# Patient Record
Sex: MALE | Race: WHITE
[De-identification: names, ages, dates, MRNs, and addresses within clinical notes are randomized per-mention and may not be internally consistent; named-entity substitution may affect disease eponyms.]

---

## 2019-11-25 ENCOUNTER — HOSPITAL ENCOUNTER (OUTPATIENT)
Dept: HOSPITAL 8 - CVU | Age: 79
Discharge: HOME | End: 2019-11-25
Attending: INTERNAL MEDICINE
Payer: MEDICARE

## 2019-11-25 DIAGNOSIS — I65.23: Primary | ICD-10-CM

## 2019-11-25 DIAGNOSIS — Z87.891: ICD-10-CM

## 2019-11-25 DIAGNOSIS — I70.8: ICD-10-CM

## 2019-11-25 DIAGNOSIS — E78.5: ICD-10-CM

## 2019-11-25 DIAGNOSIS — I10: ICD-10-CM

## 2019-11-25 PROCEDURE — 93880 EXTRACRANIAL BILAT STUDY: CPT

## 2019-12-02 ENCOUNTER — HOSPITAL ENCOUNTER (EMERGENCY)
Dept: HOSPITAL 8 - ED | Age: 79
Discharge: HOME | End: 2019-12-02
Payer: MEDICARE

## 2019-12-02 VITALS — HEIGHT: 68 IN | WEIGHT: 143.3 LBS | BODY MASS INDEX: 21.72 KG/M2

## 2019-12-02 VITALS — DIASTOLIC BLOOD PRESSURE: 63 MMHG | SYSTOLIC BLOOD PRESSURE: 136 MMHG

## 2019-12-02 DIAGNOSIS — J44.9: ICD-10-CM

## 2019-12-02 DIAGNOSIS — S62.663B: Primary | ICD-10-CM
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
## 2019-12-02 PROCEDURE — 99285 EMERGENCY DEPT VISIT HI MDM: CPT

## 2019-12-02 PROCEDURE — 90471 IMMUNIZATION ADMIN: CPT

## 2019-12-02 PROCEDURE — 13131 CMPLX RPR F/C/C/M/N/AX/G/H/F: CPT

## 2019-12-02 PROCEDURE — 12041 INTMD RPR N-HF/GENIT 2.5CM/<: CPT

## 2019-12-02 PROCEDURE — 90715 TDAP VACCINE 7 YRS/> IM: CPT

## 2019-12-02 NOTE — NUR
PT HERE WITH C/O LACERATION X 3 TO LEFT POINTER, MIDDLE, AND RING FINGER. PT 
STATES HE SLIPPED ON ICE WHILE ATTEMPTING TO "UNCLOG" THE . LAC TO 
MIDDLE FINGER X 3, SECOND AND FOURTH FINGER X 1 ON EACH. BLEEDING CONTROLLED, 
PA AT BEDSIDE.

## 2020-07-26 ENCOUNTER — HOSPITAL ENCOUNTER (EMERGENCY)
Dept: HOSPITAL 8 - ED | Age: 80
Discharge: HOME | End: 2020-07-26
Payer: MEDICARE

## 2020-07-26 VITALS — WEIGHT: 139.99 LBS | BODY MASS INDEX: 21.97 KG/M2 | HEIGHT: 67 IN

## 2020-07-26 VITALS — DIASTOLIC BLOOD PRESSURE: 72 MMHG | SYSTOLIC BLOOD PRESSURE: 141 MMHG

## 2020-07-26 DIAGNOSIS — S51.031A: Primary | ICD-10-CM
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
## 2020-07-26 DIAGNOSIS — Z87.891: ICD-10-CM
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
## 2020-07-26 PROCEDURE — 99283 EMERGENCY DEPT VISIT LOW MDM: CPT

## 2020-07-26 NOTE — NUR
TO ROOM AT THIS TIME. PT IN NO OBVIOUS DISTRESS. HELPING SON AND A SCREW WAS 
SENT FLYING INTO HIS ARM DURING USE OF CHAINSAW. BLEEDING CONTROLLED. WIFE AT 
SIDE. ALERT AND ORIENTED. PT REPORTS TETANUS IMMUNIZATION LAST YEAR.

## 2020-11-18 ENCOUNTER — HOSPITAL ENCOUNTER (OUTPATIENT)
Dept: HOSPITAL 8 - LAB | Age: 80
Discharge: HOME | End: 2020-11-18
Attending: INTERNAL MEDICINE
Payer: MEDICARE

## 2020-11-18 DIAGNOSIS — I25.10: ICD-10-CM

## 2020-11-18 DIAGNOSIS — I10: ICD-10-CM

## 2020-11-18 DIAGNOSIS — I65.29: ICD-10-CM

## 2020-11-18 DIAGNOSIS — E78.2: Primary | ICD-10-CM

## 2020-11-18 LAB
ALBUMIN SERPL-MCNC: 4.2 G/DL (ref 3.4–5)
ALP SERPL-CCNC: 60 U/L (ref 45–117)
ALT SERPL-CCNC: 17 U/L (ref 12–78)
ANION GAP SERPL CALC-SCNC: 5 MMOL/L (ref 5–15)
BILIRUB SERPL-MCNC: 0.5 MG/DL (ref 0.2–1)
CALCIUM SERPL-MCNC: 9.2 MG/DL (ref 8.5–10.1)
CHLORIDE SERPL-SCNC: 112 MMOL/L (ref 98–107)
CHOL/HDL RATIO: 2.4
CREAT SERPL-MCNC: 1.26 MG/DL (ref 0.7–1.3)
HDL CHOL %: 41 % (ref 26–37)
HDL CHOLESTEROL (DIRECT): 49 MG/DL (ref 40–60)
LDL CHOLESTEROL,CALCULATED: 58 MG/DL (ref 54–169)
LDLC/HDLC SERPL: 1.2 {RATIO} (ref 0.5–3)
PROT SERPL-MCNC: 7.7 G/DL (ref 6.4–8.2)
TRIGL SERPL-MCNC: 64 MG/DL (ref 50–200)
VLDLC SERPL CALC-MCNC: 13 MG/DL (ref 0–25)

## 2020-11-18 PROCEDURE — 80061 LIPID PANEL: CPT

## 2020-11-18 PROCEDURE — 80053 COMPREHEN METABOLIC PANEL: CPT

## 2020-11-18 PROCEDURE — 36415 COLL VENOUS BLD VENIPUNCTURE: CPT

## 2021-05-06 ENCOUNTER — HOSPITAL ENCOUNTER (INPATIENT)
Dept: HOSPITAL 8 - ED | Age: 81
LOS: 6 days | Discharge: HOME | DRG: 177 | End: 2021-05-12
Admitting: EMERGENCY MEDICINE
Payer: MEDICARE

## 2021-05-06 VITALS — DIASTOLIC BLOOD PRESSURE: 56 MMHG | SYSTOLIC BLOOD PRESSURE: 104 MMHG

## 2021-05-06 VITALS — HEIGHT: 67 IN | BODY MASS INDEX: 20.52 KG/M2 | WEIGHT: 130.73 LBS

## 2021-05-06 DIAGNOSIS — E87.5: ICD-10-CM

## 2021-05-06 DIAGNOSIS — Z87.891: ICD-10-CM

## 2021-05-06 DIAGNOSIS — I10: ICD-10-CM

## 2021-05-06 DIAGNOSIS — J43.9: ICD-10-CM

## 2021-05-06 DIAGNOSIS — N17.0: ICD-10-CM

## 2021-05-06 DIAGNOSIS — U07.1: Primary | ICD-10-CM

## 2021-05-06 DIAGNOSIS — J96.01: ICD-10-CM

## 2021-05-06 DIAGNOSIS — I95.9: ICD-10-CM

## 2021-05-06 DIAGNOSIS — E87.6: ICD-10-CM

## 2021-05-06 DIAGNOSIS — J12.82: ICD-10-CM

## 2021-05-06 DIAGNOSIS — Z90.79: ICD-10-CM

## 2021-05-06 DIAGNOSIS — E78.5: ICD-10-CM

## 2021-05-06 DIAGNOSIS — N40.0: ICD-10-CM

## 2021-05-06 LAB
ACETONE EXG QL: 1.21 UG/MLFEU (ref 0–0.52)
ALBUMIN SERPL-MCNC: 2.8 G/DL (ref 3.4–5)
ALP SERPL-CCNC: 66 U/L (ref 45–117)
ALT SERPL-CCNC: 37 U/L (ref 12–78)
ANION GAP SERPL CALC-SCNC: 9 MMOL/L (ref 5–15)
APTT BLD: 34 SECONDS (ref 25–31)
BASOPHILS # BLD AUTO: 0 X10^3/UL (ref 0–0.1)
BASOPHILS NFR BLD AUTO: 0 % (ref 0–1)
BILIRUB SERPL-MCNC: 0.7 MG/DL (ref 0.2–1)
CALCIUM SERPL-MCNC: 8.5 MG/DL (ref 8.5–10.1)
CHLORIDE SERPL-SCNC: 104 MMOL/L (ref 98–107)
CREAT SERPL-MCNC: 1.33 MG/DL (ref 0.7–1.3)
CRP SERPL-MCNC: 16 MG/DL (ref 0.02–0.49)
EOSINOPHIL # BLD AUTO: 0 X10^3/UL (ref 0–0.4)
EOSINOPHIL NFR BLD AUTO: 0 % (ref 1–7)
ERYTHROCYTE [DISTWIDTH] IN BLOOD BY AUTOMATED COUNT: 13.5 % (ref 9.4–14.8)
FIBRINOGEN PPP-MCNC: > 713 MG/DL (ref 200–340)
LYMPHOCYTES # BLD AUTO: 0.5 X10^3/UL (ref 1–3.4)
LYMPHOCYTES NFR BLD AUTO: 6 % (ref 22–44)
MCH RBC QN AUTO: 31.9 PG (ref 27.5–34.5)
MCHC RBC AUTO-ENTMCNC: 34.2 G/DL (ref 33.2–36.2)
MD: NO
MONOCYTES # BLD AUTO: 0.4 X10^3/UL (ref 0.2–0.8)
MONOCYTES NFR BLD AUTO: 5 % (ref 2–9)
NEUTROPHILS # BLD AUTO: 6.8 X10^3/UL (ref 1.8–6.8)
NEUTROPHILS NFR BLD AUTO: 89 % (ref 42–75)
PLATELET # BLD AUTO: 230 X10^3/UL (ref 130–400)
PLATELET (DIC): 208 X10^3/UL (ref 130–400)
PMV BLD AUTO: 7.8 FL (ref 7.4–10.4)
PROT SERPL-MCNC: 7.6 G/DL (ref 6.4–8.2)
PROTHROMBIN TIME: 10.7 SECONDS (ref 9.6–11.5)
RBC # BLD AUTO: 4.67 X10^6/UL (ref 4.38–5.82)

## 2021-05-06 PROCEDURE — 83735 ASSAY OF MAGNESIUM: CPT

## 2021-05-06 PROCEDURE — 86140 C-REACTIVE PROTEIN: CPT

## 2021-05-06 PROCEDURE — 96375 TX/PRO/DX INJ NEW DRUG ADDON: CPT

## 2021-05-06 PROCEDURE — 85379 FIBRIN DEGRADATION QUANT: CPT

## 2021-05-06 PROCEDURE — 82728 ASSAY OF FERRITIN: CPT

## 2021-05-06 PROCEDURE — 87040 BLOOD CULTURE FOR BACTERIA: CPT

## 2021-05-06 PROCEDURE — 85384 FIBRINOGEN ACTIVITY: CPT

## 2021-05-06 PROCEDURE — 71275 CT ANGIOGRAPHY CHEST: CPT

## 2021-05-06 PROCEDURE — 83615 LACTATE (LD) (LDH) ENZYME: CPT

## 2021-05-06 PROCEDURE — 80053 COMPREHEN METABOLIC PANEL: CPT

## 2021-05-06 PROCEDURE — 96365 THER/PROPH/DIAG IV INF INIT: CPT

## 2021-05-06 PROCEDURE — 93005 ELECTROCARDIOGRAM TRACING: CPT

## 2021-05-06 PROCEDURE — 99285 EMERGENCY DEPT VISIT HI MDM: CPT

## 2021-05-06 PROCEDURE — 36415 COLL VENOUS BLD VENIPUNCTURE: CPT

## 2021-05-06 PROCEDURE — 85610 PROTHROMBIN TIME: CPT

## 2021-05-06 PROCEDURE — 71045 X-RAY EXAM CHEST 1 VIEW: CPT

## 2021-05-06 PROCEDURE — 85730 THROMBOPLASTIN TIME PARTIAL: CPT

## 2021-05-06 PROCEDURE — 85025 COMPLETE CBC W/AUTO DIFF WBC: CPT

## 2021-05-06 PROCEDURE — U0003 INFECTIOUS AGENT DETECTION BY NUCLEIC ACID (DNA OR RNA); SEVERE ACUTE RESPIRATORY SYNDROME CORONAVIRUS 2 (SARS-COV-2) (CORONAVIRUS DISEASE [COVID-19]), AMPLIFIED PROBE TECHNIQUE, MAKING USE OF HIGH THROUGHPUT TECHNOLOGIES AS DESCRIBED BY CMS-2020-01-R: HCPCS

## 2021-05-06 PROCEDURE — 80048 BASIC METABOLIC PNL TOTAL CA: CPT

## 2021-05-06 PROCEDURE — 83605 ASSAY OF LACTIC ACID: CPT

## 2021-05-06 PROCEDURE — 84145 PROCALCITONIN (PCT): CPT

## 2021-05-06 PROCEDURE — 85049 AUTOMATED PLATELET COUNT: CPT

## 2021-05-06 NOTE — NUR
PT WC'D TO ROOM  W/ C/O SOB, COUGH, FATIGUE, HIGH 80'S RA PLACED ON 2L NC. PT 
STATES SX STARTED2 WEEKS AGO. PT STATES HE WAS EXPOSED TO COVID-19 THROUGH 
FAMILY. PT STATES HE HAS NOT BEEN TESTED FOR COVID AND HE HAS NOT RECEIVED THE 
COVID VACCINE. PT RESTING ON GURNEY. NADN. MONITORS APPLIED. VSS ON 2L NC. PT 
DENIES USE OF HOME O2. SPEAKING IN FULL SENTENCES.

## 2021-05-07 VITALS — SYSTOLIC BLOOD PRESSURE: 106 MMHG | DIASTOLIC BLOOD PRESSURE: 58 MMHG

## 2021-05-07 VITALS — SYSTOLIC BLOOD PRESSURE: 103 MMHG | DIASTOLIC BLOOD PRESSURE: 59 MMHG

## 2021-05-07 VITALS — DIASTOLIC BLOOD PRESSURE: 66 MMHG | SYSTOLIC BLOOD PRESSURE: 108 MMHG

## 2021-05-07 VITALS — DIASTOLIC BLOOD PRESSURE: 62 MMHG | SYSTOLIC BLOOD PRESSURE: 99 MMHG

## 2021-05-07 LAB
ANION GAP SERPL CALC-SCNC: 7 MMOL/L (ref 5–15)
CALCIUM SERPL-MCNC: 8 MG/DL (ref 8.5–10.1)
CHLORIDE SERPL-SCNC: 110 MMOL/L (ref 98–107)
CREAT SERPL-MCNC: 0.83 MG/DL (ref 0.7–1.3)

## 2021-05-07 PROCEDURE — XW033E5 INTRODUCTION OF REMDESIVIR ANTI-INFECTIVE INTO PERIPHERAL VEIN, PERCUTANEOUS APPROACH, NEW TECHNOLOGY GROUP 5: ICD-10-PCS

## 2021-05-07 RX ADMIN — FINASTERIDE SCH MG: 5 TABLET, FILM COATED ORAL at 11:08

## 2021-05-07 RX ADMIN — CEFTRIAXONE SCH MLS/HR: 1 INJECTION, POWDER, FOR SOLUTION INTRAMUSCULAR; INTRAVENOUS at 01:05

## 2021-05-07 RX ADMIN — DOXYCYCLINE HYCLATE SCH MG: 100 TABLET, FILM COATED ORAL at 21:36

## 2021-05-07 RX ADMIN — Medication SCH MG: at 23:30

## 2021-05-07 RX ADMIN — TERAZOSIN HYDROCHLORIDE SCH MG: 2 CAPSULE ORAL at 10:12

## 2021-05-07 RX ADMIN — DEXAMETHASONE SODIUM PHOSPHATE SCH MG: 4 INJECTION, SOLUTION INTRA-ARTICULAR; INTRALESIONAL; INTRAMUSCULAR; INTRAVENOUS; SOFT TISSUE at 10:11

## 2021-05-07 RX ADMIN — HEPARIN SODIUM SCH UNITS: 5000 INJECTION, SOLUTION INTRAVENOUS; SUBCUTANEOUS at 18:13

## 2021-05-07 RX ADMIN — AMLODIPINE BESYLATE SCH MG: 10 TABLET ORAL at 10:12

## 2021-05-07 RX ADMIN — HEPARIN SODIUM SCH UNITS: 5000 INJECTION, SOLUTION INTRAVENOUS; SUBCUTANEOUS at 01:05

## 2021-05-07 RX ADMIN — HEPARIN SODIUM SCH UNITS: 5000 INJECTION, SOLUTION INTRAVENOUS; SUBCUTANEOUS at 10:07

## 2021-05-07 RX ADMIN — DOXYCYCLINE HYCLATE SCH MG: 100 TABLET, FILM COATED ORAL at 10:11

## 2021-05-08 VITALS — SYSTOLIC BLOOD PRESSURE: 109 MMHG | DIASTOLIC BLOOD PRESSURE: 64 MMHG

## 2021-05-08 VITALS — DIASTOLIC BLOOD PRESSURE: 62 MMHG | SYSTOLIC BLOOD PRESSURE: 106 MMHG

## 2021-05-08 VITALS — DIASTOLIC BLOOD PRESSURE: 62 MMHG | SYSTOLIC BLOOD PRESSURE: 117 MMHG

## 2021-05-08 VITALS — SYSTOLIC BLOOD PRESSURE: 107 MMHG | DIASTOLIC BLOOD PRESSURE: 65 MMHG

## 2021-05-08 VITALS — SYSTOLIC BLOOD PRESSURE: 103 MMHG | DIASTOLIC BLOOD PRESSURE: 59 MMHG

## 2021-05-08 LAB
ALBUMIN SERPL-MCNC: 2.2 G/DL (ref 3.4–5)
ALP SERPL-CCNC: 59 U/L (ref 45–117)
ALT SERPL-CCNC: 44 U/L (ref 12–78)
ANION GAP SERPL CALC-SCNC: 7 MMOL/L (ref 5–15)
BASOPHILS # BLD AUTO: 0 X10^3/UL (ref 0–0.1)
BASOPHILS NFR BLD AUTO: 0 % (ref 0–1)
BILIRUB SERPL-MCNC: 0.3 MG/DL (ref 0.2–1)
CALCIUM SERPL-MCNC: 8 MG/DL (ref 8.5–10.1)
CHLORIDE SERPL-SCNC: 110 MMOL/L (ref 98–107)
CREAT SERPL-MCNC: 0.77 MG/DL (ref 0.7–1.3)
EOSINOPHIL # BLD AUTO: 0 X10^3/UL (ref 0–0.4)
EOSINOPHIL NFR BLD AUTO: 0 % (ref 1–7)
ERYTHROCYTE [DISTWIDTH] IN BLOOD BY AUTOMATED COUNT: 13.5 % (ref 9.4–14.8)
LYMPHOCYTES # BLD AUTO: 0.4 X10^3/UL (ref 1–3.4)
LYMPHOCYTES NFR BLD AUTO: 4 % (ref 22–44)
MCH RBC QN AUTO: 31.9 PG (ref 27.5–34.5)
MCHC RBC AUTO-ENTMCNC: 34.5 G/DL (ref 33.2–36.2)
MD: NO
MONOCYTES # BLD AUTO: 0.5 X10^3/UL (ref 0.2–0.8)
MONOCYTES NFR BLD AUTO: 5 % (ref 2–9)
NEUTROPHILS # BLD AUTO: 8.4 X10^3/UL (ref 1.8–6.8)
NEUTROPHILS NFR BLD AUTO: 90 % (ref 42–75)
PLATELET # BLD AUTO: 241 X10^3/UL (ref 130–400)
PMV BLD AUTO: 7.8 FL (ref 7.4–10.4)
PROT SERPL-MCNC: 6.2 G/DL (ref 6.4–8.2)
RBC # BLD AUTO: 4.12 X10^6/UL (ref 4.38–5.82)

## 2021-05-08 RX ADMIN — FINASTERIDE SCH MG: 5 TABLET, FILM COATED ORAL at 07:50

## 2021-05-08 RX ADMIN — OXYCODONE HYDROCHLORIDE AND ACETAMINOPHEN SCH MG: 500 TABLET ORAL at 08:10

## 2021-05-08 RX ADMIN — DOXYCYCLINE HYCLATE SCH MG: 100 TABLET, FILM COATED ORAL at 21:56

## 2021-05-08 RX ADMIN — ENOXAPARIN SODIUM SCH MG: 40 INJECTION SUBCUTANEOUS at 12:01

## 2021-05-08 RX ADMIN — HEPARIN SODIUM SCH UNITS: 5000 INJECTION, SOLUTION INTRAVENOUS; SUBCUTANEOUS at 07:50

## 2021-05-08 RX ADMIN — ZINC SULFATE CAP 220 MG (50 MG ELEMENTAL ZN) SCH MG: 220 (50 ZN) CAP at 12:01

## 2021-05-08 RX ADMIN — ATORVASTATIN CALCIUM SCH MG: 40 TABLET, FILM COATED ORAL at 21:56

## 2021-05-08 RX ADMIN — CHOLECALCIFEROL TAB 125 MCG (5000 UNIT) SCH UNIT: 125 TAB at 07:50

## 2021-05-08 RX ADMIN — DEXAMETHASONE SODIUM PHOSPHATE SCH MG: 4 INJECTION, SOLUTION INTRA-ARTICULAR; INTRALESIONAL; INTRAMUSCULAR; INTRAVENOUS; SOFT TISSUE at 07:49

## 2021-05-08 RX ADMIN — POTASSIUM CHLORIDE SCH MEQ: 20 TABLET, EXTENDED RELEASE ORAL at 16:42

## 2021-05-08 RX ADMIN — Medication SCH MG: at 07:50

## 2021-05-08 RX ADMIN — OXYCODONE HYDROCHLORIDE AND ACETAMINOPHEN SCH MG: 500 TABLET ORAL at 16:42

## 2021-05-08 RX ADMIN — AMLODIPINE BESYLATE SCH MG: 10 TABLET ORAL at 07:48

## 2021-05-08 RX ADMIN — Medication SCH MG: at 21:56

## 2021-05-08 RX ADMIN — TERAZOSIN HYDROCHLORIDE SCH MG: 2 CAPSULE ORAL at 07:49

## 2021-05-08 RX ADMIN — DOXYCYCLINE HYCLATE SCH MG: 100 TABLET, FILM COATED ORAL at 07:50

## 2021-05-08 RX ADMIN — CEFTRIAXONE SCH MLS/HR: 1 INJECTION, POWDER, FOR SOLUTION INTRAMUSCULAR; INTRAVENOUS at 01:24

## 2021-05-08 RX ADMIN — HEPARIN SODIUM SCH UNITS: 5000 INJECTION, SOLUTION INTRAVENOUS; SUBCUTANEOUS at 01:25

## 2021-05-08 RX ADMIN — Medication SCH MG: at 21:00

## 2021-05-08 RX ADMIN — REMDESIVIR SCH MLS/HR: 5 INJECTION INTRAVENOUS at 22:55

## 2021-05-09 VITALS — DIASTOLIC BLOOD PRESSURE: 51 MMHG | SYSTOLIC BLOOD PRESSURE: 102 MMHG

## 2021-05-09 VITALS — SYSTOLIC BLOOD PRESSURE: 107 MMHG | DIASTOLIC BLOOD PRESSURE: 62 MMHG

## 2021-05-09 VITALS — SYSTOLIC BLOOD PRESSURE: 112 MMHG | DIASTOLIC BLOOD PRESSURE: 61 MMHG

## 2021-05-09 VITALS — SYSTOLIC BLOOD PRESSURE: 102 MMHG | DIASTOLIC BLOOD PRESSURE: 61 MMHG

## 2021-05-09 LAB
ALBUMIN SERPL-MCNC: 2.3 G/DL (ref 3.4–5)
ALP SERPL-CCNC: 57 U/L (ref 45–117)
ALT SERPL-CCNC: 54 U/L (ref 12–78)
ANION GAP SERPL CALC-SCNC: 4 MMOL/L (ref 5–15)
BILIRUB SERPL-MCNC: 0.2 MG/DL (ref 0.2–1)
CALCIUM SERPL-MCNC: 8.2 MG/DL (ref 8.5–10.1)
CHLORIDE SERPL-SCNC: 112 MMOL/L (ref 98–107)
CREAT SERPL-MCNC: 0.84 MG/DL (ref 0.7–1.3)
PROT SERPL-MCNC: 6.2 G/DL (ref 6.4–8.2)

## 2021-05-09 RX ADMIN — Medication SCH MG: at 21:09

## 2021-05-09 RX ADMIN — ENOXAPARIN SODIUM SCH MG: 40 INJECTION SUBCUTANEOUS at 11:38

## 2021-05-09 RX ADMIN — TERAZOSIN HYDROCHLORIDE SCH MG: 2 CAPSULE ORAL at 08:16

## 2021-05-09 RX ADMIN — ATORVASTATIN CALCIUM SCH MG: 40 TABLET, FILM COATED ORAL at 21:08

## 2021-05-09 RX ADMIN — REMDESIVIR SCH MLS/HR: 5 INJECTION INTRAVENOUS at 23:15

## 2021-05-09 RX ADMIN — Medication SCH MG: at 08:17

## 2021-05-09 RX ADMIN — OXYCODONE HYDROCHLORIDE AND ACETAMINOPHEN SCH MG: 500 TABLET ORAL at 16:47

## 2021-05-09 RX ADMIN — CHOLECALCIFEROL TAB 125 MCG (5000 UNIT) SCH UNIT: 125 TAB at 08:17

## 2021-05-09 RX ADMIN — Medication SCH MG: at 21:00

## 2021-05-09 RX ADMIN — DOXYCYCLINE HYCLATE SCH MG: 100 TABLET, FILM COATED ORAL at 08:17

## 2021-05-09 RX ADMIN — AMLODIPINE BESYLATE SCH MG: 10 TABLET ORAL at 08:23

## 2021-05-09 RX ADMIN — POTASSIUM CHLORIDE SCH MEQ: 20 TABLET, EXTENDED RELEASE ORAL at 16:47

## 2021-05-09 RX ADMIN — DOXYCYCLINE HYCLATE SCH MG: 100 TABLET, FILM COATED ORAL at 21:09

## 2021-05-09 RX ADMIN — FINASTERIDE SCH MG: 5 TABLET, FILM COATED ORAL at 08:17

## 2021-05-09 RX ADMIN — OXYCODONE HYDROCHLORIDE AND ACETAMINOPHEN SCH MG: 500 TABLET ORAL at 08:17

## 2021-05-09 RX ADMIN — POTASSIUM CHLORIDE SCH MEQ: 20 TABLET, EXTENDED RELEASE ORAL at 08:16

## 2021-05-09 RX ADMIN — DEXAMETHASONE SODIUM PHOSPHATE SCH MG: 4 INJECTION, SOLUTION INTRA-ARTICULAR; INTRALESIONAL; INTRAMUSCULAR; INTRAVENOUS; SOFT TISSUE at 08:17

## 2021-05-09 RX ADMIN — CEFTRIAXONE SCH MLS/HR: 1 INJECTION, POWDER, FOR SOLUTION INTRAMUSCULAR; INTRAVENOUS at 00:56

## 2021-05-09 RX ADMIN — ZINC SULFATE CAP 220 MG (50 MG ELEMENTAL ZN) SCH MG: 220 (50 ZN) CAP at 11:37

## 2021-05-10 VITALS — DIASTOLIC BLOOD PRESSURE: 58 MMHG | SYSTOLIC BLOOD PRESSURE: 112 MMHG

## 2021-05-10 VITALS — SYSTOLIC BLOOD PRESSURE: 112 MMHG | DIASTOLIC BLOOD PRESSURE: 66 MMHG

## 2021-05-10 VITALS — SYSTOLIC BLOOD PRESSURE: 116 MMHG | DIASTOLIC BLOOD PRESSURE: 66 MMHG

## 2021-05-10 VITALS — DIASTOLIC BLOOD PRESSURE: 52 MMHG | SYSTOLIC BLOOD PRESSURE: 96 MMHG

## 2021-05-10 LAB
ALBUMIN SERPL-MCNC: 2.3 G/DL (ref 3.4–5)
ALP SERPL-CCNC: 57 U/L (ref 45–117)
ALT SERPL-CCNC: 51 U/L (ref 12–78)
ANION GAP SERPL CALC-SCNC: 5 MMOL/L (ref 5–15)
BASOPHILS # BLD AUTO: 0 X10^3/UL (ref 0–0.1)
BASOPHILS NFR BLD AUTO: 0 % (ref 0–1)
BILIRUB SERPL-MCNC: 0.2 MG/DL (ref 0.2–1)
CALCIUM SERPL-MCNC: 8.4 MG/DL (ref 8.5–10.1)
CHLORIDE SERPL-SCNC: 112 MMOL/L (ref 98–107)
CREAT SERPL-MCNC: 0.81 MG/DL (ref 0.7–1.3)
CRP SERPL-MCNC: 2.1 MG/DL (ref 0.02–0.49)
EOSINOPHIL # BLD AUTO: 0 X10^3/UL (ref 0–0.4)
EOSINOPHIL NFR BLD AUTO: 0 % (ref 1–7)
ERYTHROCYTE [DISTWIDTH] IN BLOOD BY AUTOMATED COUNT: 13.5 % (ref 9.4–14.8)
LYMPHOCYTES # BLD AUTO: 0.6 X10^3/UL (ref 1–3.4)
LYMPHOCYTES NFR BLD AUTO: 9 % (ref 22–44)
MCH RBC QN AUTO: 31.9 PG (ref 27.5–34.5)
MCHC RBC AUTO-ENTMCNC: 33.9 G/DL (ref 33.2–36.2)
MD: (no result)
MONOCYTES # BLD AUTO: 0.5 X10^3/UL (ref 0.2–0.8)
MONOCYTES NFR BLD AUTO: 7 % (ref 2–9)
NEUTROPHILS # BLD AUTO: 5.6 X10^3/UL (ref 1.8–6.8)
NEUTROPHILS NFR BLD AUTO: 84 % (ref 42–75)
PLATELET # BLD AUTO: 270 X10^3/UL (ref 130–400)
PMV BLD AUTO: 7.9 FL (ref 7.4–10.4)
PROT SERPL-MCNC: 6 G/DL (ref 6.4–8.2)
RBC # BLD AUTO: 4.14 X10^6/UL (ref 4.38–5.82)

## 2021-05-10 RX ADMIN — DEXAMETHASONE SODIUM PHOSPHATE SCH MG: 4 INJECTION, SOLUTION INTRA-ARTICULAR; INTRALESIONAL; INTRAMUSCULAR; INTRAVENOUS; SOFT TISSUE at 07:52

## 2021-05-10 RX ADMIN — REMDESIVIR SCH MLS/HR: 5 INJECTION INTRAVENOUS at 23:02

## 2021-05-10 RX ADMIN — Medication SCH MG: at 20:42

## 2021-05-10 RX ADMIN — CHOLECALCIFEROL TAB 125 MCG (5000 UNIT) SCH UNIT: 125 TAB at 07:52

## 2021-05-10 RX ADMIN — FINASTERIDE SCH MG: 5 TABLET, FILM COATED ORAL at 07:51

## 2021-05-10 RX ADMIN — POTASSIUM CHLORIDE SCH MEQ: 20 TABLET, EXTENDED RELEASE ORAL at 07:51

## 2021-05-10 RX ADMIN — ENOXAPARIN SODIUM SCH MG: 40 INJECTION SUBCUTANEOUS at 11:54

## 2021-05-10 RX ADMIN — Medication SCH MG: at 20:43

## 2021-05-10 RX ADMIN — AMLODIPINE BESYLATE SCH MG: 10 TABLET ORAL at 07:52

## 2021-05-10 RX ADMIN — TERAZOSIN HYDROCHLORIDE SCH MG: 2 CAPSULE ORAL at 07:52

## 2021-05-10 RX ADMIN — POTASSIUM CHLORIDE SCH MEQ: 20 TABLET, EXTENDED RELEASE ORAL at 17:12

## 2021-05-10 RX ADMIN — ZINC SULFATE CAP 220 MG (50 MG ELEMENTAL ZN) SCH MG: 220 (50 ZN) CAP at 11:53

## 2021-05-10 RX ADMIN — CEFTRIAXONE SCH MLS/HR: 1 INJECTION, POWDER, FOR SOLUTION INTRAMUSCULAR; INTRAVENOUS at 01:25

## 2021-05-10 RX ADMIN — DOXYCYCLINE HYCLATE SCH MG: 100 TABLET, FILM COATED ORAL at 20:42

## 2021-05-10 RX ADMIN — OXYCODONE HYDROCHLORIDE AND ACETAMINOPHEN SCH MG: 500 TABLET ORAL at 07:51

## 2021-05-10 RX ADMIN — DOXYCYCLINE HYCLATE SCH MG: 100 TABLET, FILM COATED ORAL at 07:52

## 2021-05-10 RX ADMIN — OXYCODONE HYDROCHLORIDE AND ACETAMINOPHEN SCH MG: 500 TABLET ORAL at 17:12

## 2021-05-10 RX ADMIN — ATORVASTATIN CALCIUM SCH MG: 40 TABLET, FILM COATED ORAL at 20:42

## 2021-05-10 RX ADMIN — Medication SCH MG: at 07:52

## 2021-05-11 VITALS — DIASTOLIC BLOOD PRESSURE: 67 MMHG | SYSTOLIC BLOOD PRESSURE: 122 MMHG

## 2021-05-11 VITALS — DIASTOLIC BLOOD PRESSURE: 66 MMHG | SYSTOLIC BLOOD PRESSURE: 117 MMHG

## 2021-05-11 VITALS — SYSTOLIC BLOOD PRESSURE: 109 MMHG | DIASTOLIC BLOOD PRESSURE: 67 MMHG

## 2021-05-11 VITALS — DIASTOLIC BLOOD PRESSURE: 62 MMHG | SYSTOLIC BLOOD PRESSURE: 110 MMHG

## 2021-05-11 LAB
ALBUMIN SERPL-MCNC: 2.4 G/DL (ref 3.4–5)
ALP SERPL-CCNC: 55 U/L (ref 45–117)
ALT SERPL-CCNC: 64 U/L (ref 12–78)
ANION GAP SERPL CALC-SCNC: 3 MMOL/L (ref 5–15)
BILIRUB SERPL-MCNC: 0.3 MG/DL (ref 0.2–1)
CALCIUM SERPL-MCNC: 8.3 MG/DL (ref 8.5–10.1)
CHLORIDE SERPL-SCNC: 109 MMOL/L (ref 98–107)
CREAT SERPL-MCNC: 0.86 MG/DL (ref 0.7–1.3)
PROT SERPL-MCNC: 6.2 G/DL (ref 6.4–8.2)

## 2021-05-11 RX ADMIN — TERAZOSIN HYDROCHLORIDE SCH MG: 2 CAPSULE ORAL at 08:58

## 2021-05-11 RX ADMIN — Medication SCH MG: at 20:40

## 2021-05-11 RX ADMIN — CHOLECALCIFEROL TAB 125 MCG (5000 UNIT) SCH UNIT: 125 TAB at 08:59

## 2021-05-11 RX ADMIN — REMDESIVIR SCH MLS/HR: 5 INJECTION INTRAVENOUS at 23:08

## 2021-05-11 RX ADMIN — ENOXAPARIN SODIUM SCH MG: 40 INJECTION SUBCUTANEOUS at 12:26

## 2021-05-11 RX ADMIN — CEFTRIAXONE SCH MLS/HR: 1 INJECTION, POWDER, FOR SOLUTION INTRAMUSCULAR; INTRAVENOUS at 01:10

## 2021-05-11 RX ADMIN — DOXYCYCLINE HYCLATE SCH MG: 100 TABLET, FILM COATED ORAL at 20:44

## 2021-05-11 RX ADMIN — AMLODIPINE BESYLATE SCH MG: 10 TABLET ORAL at 08:59

## 2021-05-11 RX ADMIN — ZINC SULFATE CAP 220 MG (50 MG ELEMENTAL ZN) SCH MG: 220 (50 ZN) CAP at 12:25

## 2021-05-11 RX ADMIN — FINASTERIDE SCH MG: 5 TABLET, FILM COATED ORAL at 12:26

## 2021-05-11 RX ADMIN — ATORVASTATIN CALCIUM SCH MG: 40 TABLET, FILM COATED ORAL at 20:44

## 2021-05-11 RX ADMIN — DEXAMETHASONE SODIUM PHOSPHATE SCH MG: 4 INJECTION, SOLUTION INTRA-ARTICULAR; INTRALESIONAL; INTRAMUSCULAR; INTRAVENOUS; SOFT TISSUE at 09:00

## 2021-05-11 RX ADMIN — Medication SCH MG: at 08:58

## 2021-05-11 RX ADMIN — OXYCODONE HYDROCHLORIDE AND ACETAMINOPHEN SCH MG: 500 TABLET ORAL at 08:58

## 2021-05-11 RX ADMIN — Medication SCH MG: at 20:44

## 2021-05-11 RX ADMIN — OXYCODONE HYDROCHLORIDE AND ACETAMINOPHEN SCH MG: 500 TABLET ORAL at 18:00

## 2021-05-11 RX ADMIN — DOXYCYCLINE HYCLATE SCH MG: 100 TABLET, FILM COATED ORAL at 08:59

## 2021-05-12 VITALS — SYSTOLIC BLOOD PRESSURE: 115 MMHG | DIASTOLIC BLOOD PRESSURE: 70 MMHG

## 2021-05-12 VITALS — DIASTOLIC BLOOD PRESSURE: 62 MMHG | SYSTOLIC BLOOD PRESSURE: 76 MMHG

## 2021-05-12 LAB
ANION GAP SERPL CALC-SCNC: 3 MMOL/L (ref 5–15)
CALCIUM SERPL-MCNC: 8.9 MG/DL (ref 8.5–10.1)
CHLORIDE SERPL-SCNC: 105 MMOL/L (ref 98–107)
CREAT SERPL-MCNC: 0.91 MG/DL (ref 0.7–1.3)
D DIMER PPP FEU-MCNC: 1 UG/MLFEU (ref 0–0.52)
FIBRINOGEN PPP-MCNC: 497 MG/DL (ref 200–340)

## 2021-05-12 RX ADMIN — DOXYCYCLINE HYCLATE SCH MG: 100 TABLET, FILM COATED ORAL at 08:43

## 2021-05-12 RX ADMIN — ZINC SULFATE CAP 220 MG (50 MG ELEMENTAL ZN) SCH MG: 220 (50 ZN) CAP at 11:26

## 2021-05-12 RX ADMIN — Medication SCH MG: at 08:43

## 2021-05-12 RX ADMIN — OXYCODONE HYDROCHLORIDE AND ACETAMINOPHEN SCH MG: 500 TABLET ORAL at 08:43

## 2021-05-12 RX ADMIN — AMLODIPINE BESYLATE SCH MG: 10 TABLET ORAL at 08:43

## 2021-05-12 RX ADMIN — CHOLECALCIFEROL TAB 125 MCG (5000 UNIT) SCH UNIT: 125 TAB at 08:44

## 2021-05-12 RX ADMIN — DEXAMETHASONE SODIUM PHOSPHATE SCH MG: 4 INJECTION, SOLUTION INTRA-ARTICULAR; INTRALESIONAL; INTRAMUSCULAR; INTRAVENOUS; SOFT TISSUE at 09:49

## 2021-05-12 RX ADMIN — FINASTERIDE SCH MG: 5 TABLET, FILM COATED ORAL at 09:50

## 2021-05-12 RX ADMIN — TERAZOSIN HYDROCHLORIDE SCH MG: 2 CAPSULE ORAL at 08:43

## 2021-05-12 RX ADMIN — CEFTRIAXONE SCH MLS/HR: 1 INJECTION, POWDER, FOR SOLUTION INTRAMUSCULAR; INTRAVENOUS at 01:11

## 2021-05-12 RX ADMIN — ENOXAPARIN SODIUM SCH MG: 40 INJECTION SUBCUTANEOUS at 11:26
